# Patient Record
(demographics unavailable — no encounter records)

---

## 2024-11-07 NOTE — HISTORY OF PRESENT ILLNESS
[de-identified] : Pt is a 46 y/o F 2 weeks s/p VSG who presents today feeling well here for post op care. Pt denies any fevers, chest pn, sob, calf pain, n,v,c,d, reflux, or abd pn since sx. Pt states she is meeting her daily protein and fluid intake goals without difficulty. States she is walking daily for exercise and is taking her vit daily as directed. Pt has lost 15 lbs total since sx which she is very happy with. No other concerns at this time.

## 2024-11-07 NOTE — PHYSICAL EXAM
[Obese] : obese [Normal] : affect appropriate [de-identified] : incisions healing well, dermabond intact. no evidence of bleeding, oozing, or infection

## 2024-11-07 NOTE — ASSESSMENT
[FreeTextEntry1] : Pt is a 46 y/o F 2 weeks s/p VSG who presents today feeling well here for post op care. Pt denies any fevers, chest pn, sob, calf pain, n,v,c,d, reflux, or abd pn since sx. Pt states she is meeting her daily protein and fluid intake goals without difficulty. States she is walking daily for exercise and is taking her vit daily as directed. Pt has lost 15 lbs total since sx which she is very happy with. No other concerns at this time.

## 2024-12-05 NOTE — ASSESSMENT
[FreeTextEntry1] : Pt is a 44 y/o F 2 mo s/p VSG who presents today feeling well here for routine f/u. Pt reports doing much better since her last visit and is tolerating pureed textured foods mainly, states she has tried some solid foods which she tolerated. Will consult with nutrition today. Dietary reccs were discussed with pt including small bites of food at a time, eating slowly, and small portions of food at a time. Denies any frequent or daily n,v,c,d, reflux, or abd pn. Pt has lost 20 lbs since sx which she is very happy with and plans to start going to the gym to do strength training. Pt is compliant with her daily vit. No other concerns at this time.

## 2024-12-05 NOTE — HISTORY OF PRESENT ILLNESS
[de-identified] : Pt is a 44 y/o F 2 mo s/p VSG who presents today feeling well here for routine f/u. Pt reports doing much better since her last visit and is tolerating pureed textured foods mainly, states she has tried some solid foods which she tolerated. Will consult with nutrition today. Dietary reccs were discussed with pt including small bites of food at a time, eating slowly, and small portions of food at a time. Denies any frequent or daily n,v,c,d, reflux, or abd pn. Pt has lost 20 lbs since sx which she is very happy with and plans to start going to the gym to do strength training. Pt is compliant with her daily vit. No other concerns at this time.

## 2025-03-06 NOTE — HISTORY OF PRESENT ILLNESS
[de-identified] : Pt is a 46 y/o F 5 mo s/p VSG who presents today feeling well here for post op care. Pt has lost 40 lbs since sx which she is very happy with. States she is tolerating her diet overall and continues to focus on consuming adequate daily protein and water. Pt is compliant with her daily vit and is physically active throughout the week. Will obtain blood work. No other concerns at this time.

## 2025-03-06 NOTE — ASSESSMENT
[FreeTextEntry1] : Pt is a 46 y/o F 5 mo s/p VSG who presents today feeling well here for post op care. Pt has lost 40 lbs since sx which she is very happy with. States she is tolerating her diet overall and continues to focus on consuming adequate daily protein and water. Pt is compliant with her daily vit and is physically active throughout the week. Will obtain blood work. No other concerns at this time.

## 2025-06-19 NOTE — END OF VISIT
[Time Spent: ___ minutes] : I have spent [unfilled] minutes of time on the encounter which excludes teaching and separately reported services. [FreeTextEntry3] : All medical record entries made by the Scribe were at my, Dr. Raúl Chambers , direction and personally dictated by me on 06/19/2025 . I have reviewed the chart and agree that the record accurately reflects my personal performance of the history, physical exam, assessment and plan. I have also personally directed, reviewed, and agreed with the chart.

## 2025-06-19 NOTE — ADDENDUM
[FreeTextEntry1] :   Documented by Siobhan De La Fuente acting as a scribe for Dr. Raúl Chambers on 06/19/2025  .

## 2025-06-19 NOTE — ASSESSMENT
[FreeTextEntry1] : Pedro Lane is a 46-year-old female 8 mo s/p VSG who presents to see me today for a follow-up visit.  [place] has lost approximately 40 lbs. Patient states that she is doing well overall and denies pain, n/v, fevers, calf pain, dizziness, chest pain, shortness of breath, acid reflux or PO intolerance. Patient appears well and is in excellent overall health today. Patient reports positive progress since surgery. Tolerating a regular well balanced diet well and compliant with vitamins. Exercising incorporating cardio and strength training. Counseled the patient on the continued importance of adhering to a high-protein, high-fiber diet and maintaining a regular exercise routine to support ongoing weight loss and overall health. Reinforced the importance of maintaining vitamin regimen. Will see our dietitan today. Will follow up in 3 months.   I, Dr. Raúl Chambers personally performed the evaluation and management (E/M) services for this patient. That E/M includes conducting the clinically appropriate initial history &/or exam, assessing all conditions, and establishing the plan of care. Today, my PA, Lola Carroll, was present during my evaluation and management service for this patient and assisted in scribing the encounter and was here to observe my E/M service for this patient and follow plan of care established by me going forward.

## 2025-06-19 NOTE — HISTORY OF PRESENT ILLNESS
[de-identified] : Pedro Lane is a 46-year-old female 8 mo s/p VSG who presents to see me today for a follow-up visit.  [place] has lost approximately 40 lbs. Patient states that she is doing well overall and denies pain, n/v, fevers, calf pain, dizziness, chest pain, shortness of breath, acid reflux or PO intolerance. Patient appears well and is in excellent overall health today. Patient reports positive progress since surgery. Tolerating a regular well balanced diet well and compliant with vitamins. Exercising incorporating cardio and strength training. Counseled the patient on the continued importance of adhering to a high-protein, high-fiber diet and maintaining a regular exercise routine to support ongoing weight loss and overall health. Reinforced the importance of maintaining vitamin regimen. Will see our dietitan today. Will follow up in 3 months.